# Patient Record
Sex: FEMALE | Race: ASIAN | URBAN - METROPOLITAN AREA
[De-identification: names, ages, dates, MRNs, and addresses within clinical notes are randomized per-mention and may not be internally consistent; named-entity substitution may affect disease eponyms.]

---

## 2019-09-16 ENCOUNTER — THERAPY VISIT (OUTPATIENT)
Dept: PHYSICAL THERAPY | Facility: CLINIC | Age: 49
End: 2019-09-16
Payer: COMMERCIAL

## 2019-09-16 DIAGNOSIS — Z98.1 S/P CERVICAL SPINAL FUSION: ICD-10-CM

## 2019-09-16 PROCEDURE — 97110 THERAPEUTIC EXERCISES: CPT | Mod: GP | Performed by: PHYSICAL THERAPIST

## 2019-09-16 NOTE — LETTER
RADHA DE LEON [facility] [place]  [address]  [place] [address]  [phone]    2019    Re: Eileen Spears   :   1970  MRN:  [id number]   REFERRING PHYSICIAN:   Roderick DE LEON [facility] [place]    Date of Initial Evaluation:  19  Visits:  Rxs Used: 1  Reason for Referral:  S/P cervical spinal fusion    [facility] Initial Evaluation    Subjective:  Eileen Spears being seen for s/p C5-7 fusion.   Problem began 2019. Where condition occurred: at home.Problem occurred: spine surgery  and reported as 0/10 on pain scale. General health as reported by patient is good. Pertinent medical history includes:  High blood pressure and sleep disorder/apnea.  Medical allergies: none.  Surgeries include:  None.  Current medications:  High blood pressure medication and sleep medication.   Primary job tasks include:  Prolonged standing.  Pain quality: none. Pain frequency: none. Pain timing: none. Since onset symptoms are rapidly improving (improved after surgery).   There was none improvement following previous treatment.   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  Barriers include:  None as reported by patient.  Red flags:  None as reported by patient.    Objective:    CERVICAL:    Posture: good posture awareness and able to self correct without cues    Neurological:    Motor Deficit:  Myotomes L R   C4 (shoulder elevation) 5/5 5/5   C5 (shoulder abduction) 5-/5 5/5   C6 (elbow flexion) 5/5 5/5   C7 (elbow extension) 5/5 5/5   C8 (thumb extension)     T1 (finger add/abd)      Strength (lb)       Sensory Deficit, Reflexes, Dural Signs: normal light touch sensation    AROM: (Major, Moderate, Minimal or Nil loss)  Movement Loss Adalid Mod Min Nil Pain   Protrusion        Flexion     3fingers   Retraction        Extension   x  32degs   Left Rotation  x   40degs   Right Rotation  x   47degs   Left Side Bending  x    15degs   Right Side bending  x   22degs     Repeated movement testing: not testing    Static Tests: soft tissue tightness in B upper traps and levator scapulaes, normal C2-7 joint mobility    Assessment/Plan:    Patient is a 49 year old female with cervical complaints.    Patient has the following significant findings with corresponding treatment plan.                Diagnosis 1:  S/p C5-7 fusion  Decreased ROM/flexibility - manual therapy, therapeutic exercise, therapeutic activity and home program  Impaired posture - neuro re-education and therapeutic activities  Previous and current functional limitations:  (See Goal Flow Sheet for this information)    Short term and Long term goals: (See Goal Flow Sheet for this information)   Communication ability:  Patient appears to be able to clearly communicate and understand verbal and written communication and follow directions correctly.  Treatment Explanation - The following has been discussed with the patient:   RX ordered/plan of care  Anticipated outcomes  Possible risks and side effects  This patient would benefit from PT intervention to resume normal activities.   Rehab potential is good.    Frequency:  1 X week, once daily  Duration:  for 12 weeks  Discharge Plan:  Achieve all LTG.  Independent in home treatment program.  Reach maximal therapeutic benefit.    Thank you for your referral.    INQUIRIES  Therapist: OSIEL Rush [facility]  [address] [place] [address]  Phone: [phone]  Fax: [phone]

## 2019-09-16 NOTE — PROGRESS NOTES
[facility] Initial Evaluation  Subjective:    Eileen Spears being seen for s/p C5-7 fusion.   Problem began 5/16/2019. Where condition occurred: at home.Problem occurred: spine surgery  and reported as 0/10 on pain scale. General health as reported by patient is good. Pertinent medical history includes:  High blood pressure and sleep disorder/apnea.  Medical allergies: none.  Surgeries include:  None.  Current medications:  High blood pressure medication and sleep medication.   Primary job tasks include:  Prolonged standing.  Pain quality: none. Pain frequency: none. Pain timing: none. Since onset symptoms are rapidly improving (improved after surgery).   There was none improvement following previous treatment.   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    Barriers include:  None as reported by patient.  Red flags:  None as reported by patient.                      Objective:        CERVICAL:    Posture: good posture awareness and able to self correct without cues    Neurological:    Motor Deficit:  Myotomes L R   C4 (shoulder elevation) 5/5 5/5   C5 (shoulder abduction) 5-/5 5/5   C6 (elbow flexion) 5/5 5/5   C7 (elbow extension) 5/5 5/5   C8 (thumb extension)     T1 (finger add/abd)      Strength (lb)       Sensory Deficit, Reflexes, Dural Signs: normal light touch sensation    AROM: (Major, Moderate, Minimal or Nil loss)  Movement Loss Adalid Mod Min Nil Pain   Protrusion        Flexion     3fingers   Retraction        Extension   x  32degs   Left Rotation  x   40degs   Right Rotation  x   47degs   Left Side Bending  x   15degs   Right Side bending  x   22degs     Repeated movement testing: not testing      Static Tests: soft tissue tightness in B upper traps and levator scapulaes, normal C2-7 joint mobility            System    Physical Exam    General     ROS    Assessment/Plan:    Patient is a 49 year old female with cervical  complaints.    Patient has the following significant findings with corresponding treatment plan.                Diagnosis 1:  S/p C5-7 fusion  Decreased ROM/flexibility - manual therapy, therapeutic exercise, therapeutic activity and home program  Impaired posture - neuro re-education and therapeutic activities    Previous and current functional limitations:  (See Goal Flow Sheet for this information)    Short term and Long term goals: (See Goal Flow Sheet for this information)     Communication ability:  Patient appears to be able to clearly communicate and understand verbal and written communication and follow directions correctly.  Treatment Explanation - The following has been discussed with the patient:   RX ordered/plan of care  Anticipated outcomes  Possible risks and side effects  This patient would benefit from PT intervention to resume normal activities.   Rehab potential is good.    Frequency:  1 X week, once daily  Duration:  for 12 weeks  Discharge Plan:  Achieve all LTG.  Independent in home treatment program.  Reach maximal therapeutic benefit.    Please refer to the daily flowsheet for treatment today, total treatment time and time spent performing 1:1 timed codes.

## 2019-09-23 ENCOUNTER — THERAPY VISIT (OUTPATIENT)
Dept: PHYSICAL THERAPY | Facility: CLINIC | Age: 49
End: 2019-09-23
Payer: COMMERCIAL

## 2019-09-23 DIAGNOSIS — Z98.1 S/P CERVICAL SPINAL FUSION: ICD-10-CM

## 2019-09-23 PROCEDURE — 97112 NEUROMUSCULAR REEDUCATION: CPT | Mod: GP | Performed by: PHYSICAL THERAPIST

## 2019-09-23 PROCEDURE — 97110 THERAPEUTIC EXERCISES: CPT | Mod: GP | Performed by: PHYSICAL THERAPIST

## 2019-09-30 ENCOUNTER — THERAPY VISIT (OUTPATIENT)
Dept: PHYSICAL THERAPY | Facility: CLINIC | Age: 49
End: 2019-09-30
Payer: COMMERCIAL

## 2019-09-30 DIAGNOSIS — Z98.1 S/P CERVICAL SPINAL FUSION: ICD-10-CM

## 2019-09-30 PROCEDURE — 97110 THERAPEUTIC EXERCISES: CPT | Mod: GP | Performed by: PHYSICAL THERAPIST

## 2019-09-30 PROCEDURE — 97112 NEUROMUSCULAR REEDUCATION: CPT | Mod: GP | Performed by: PHYSICAL THERAPIST

## 2019-10-14 ENCOUNTER — THERAPY VISIT (OUTPATIENT)
Dept: PHYSICAL THERAPY | Facility: CLINIC | Age: 49
End: 2019-10-14
Payer: COMMERCIAL

## 2019-10-14 DIAGNOSIS — Z98.1 S/P CERVICAL SPINAL FUSION: ICD-10-CM

## 2019-10-14 PROCEDURE — 97112 NEUROMUSCULAR REEDUCATION: CPT | Mod: GP | Performed by: PHYSICAL THERAPIST

## 2019-10-14 PROCEDURE — 97110 THERAPEUTIC EXERCISES: CPT | Mod: GP | Performed by: PHYSICAL THERAPIST

## 2019-10-14 NOTE — PROGRESS NOTES
Subjective:  HPI                    Objective:  System    Physical Exam    General     ROS    Assessment/Plan:    DISCHARGE REPORT    Progress reporting period is from 9/16/19 to 10/14/19.     SUBJECTIVE  Subjective: pt reports feeling good, no pain, just occasional stiffness that resolves with stretching. admits the exercises help and on avg does them every other day.   Current Pain level: 0/10   Initial Pain level: 0/10   Changes in function: Yes, see goal flow sheet for change in function   Adverse reactions: None;   ,     The objective findings are from DOS 10/14/19.    OBJECTIVE  Objective: AROM cervical SB L:31degs R:33degs  rotation L:54degs R:64degs        ASSESSMENT/PLAN  Updated problem list and treatment plan: Diagnosis 1:  S/p cervical fusion  STG/LTGs have been met or progress has been made towards goals:  Yes, painfree driving  Assessment of Progress: The patient has met all of their long term goals.  Self Management Plans:  Patient is independent in a home treatment program.  [facility] continues to require the following intervention to meet STG and LTG's: PT intervention is no longer required to meet STG/LTG.  We will discharge this patient from PT.    Recommendations:  This patient is ready to be discharged from therapy and continue their home treatment program.    Please refer to the daily flowsheet for treatment today, total treatment time and time spent performing 1:1 timed codes.

## 2019-10-14 NOTE — LETTER
RADHA DE LEON [facility] [place]  [address]  [place] [id number] [phone]    October 15, 2019    Re: Eileen Spears   :   1970  MRN:  [id number]   REFERRING PHYSICIAN:   Roderick CEDENO    Date of Initial Evaluation:  19  Visits:  Rxs Used: 4  Reason for Referral:  S/P cervical spinal fusion    DISCHARGE REPORT  Progress reporting period is from 19 to 10/14/19.     SUBJECTIVE  Subjective: pt reports feeling good, no pain, just occasional stiffness that resolves with stretching. admits the exercises help and on avg does them every other day.   Current Pain level: 0/10   Initial Pain level: 0/10   Changes in function: Yes, see goal flow sheet for change in function   Adverse reactions: None  The objective findings are from DOS 10/14/19.    OBJECTIVE  Objective: AROM cervical SB L:31degs R:33degs  rotation L:54degs R:64degs        ASSESSMENT/PLAN  Updated problem list and treatment plan: Diagnosis 1:  S/p cervical fusion  STG/LTGs have been met or progress has been made towards goals:  Yes, painfree driving  Assessment of Progress: The patient has met all of their long term goals.  Self Management Plans:  Patient is independent in a home treatment program.  Eileen Saravia continues to require the following intervention to meet STG and LTG's: PT intervention is no longer required to meet STG/LTG.  We will discharge this patient from PT.    Recommendations:  This patient is ready to be discharged from therapy and continue their home treatment program.    Thank you for your referral.    INQUIRIES  Therapist: OSIEL Rush [facility]  [address] [place] [address]  Phone: [phone]  Fax: [phone]

## 2019-11-25 PROBLEM — Z98.1 S/P CERVICAL SPINAL FUSION: Status: RESOLVED | Noted: 2019-09-16 | Resolved: 2019-11-25

## 2021-12-28 PROCEDURE — 88305 TISSUE EXAM BY PATHOLOGIST: CPT | Performed by: PATHOLOGY

## 2021-12-29 ENCOUNTER — LAB REQUISITION (OUTPATIENT)
Dept: LAB | Facility: CLINIC | Age: 51
End: 2021-12-29
Payer: COMMERCIAL

## 2021-12-30 LAB
PATH REPORT.COMMENTS IMP SPEC: NORMAL
PATH REPORT.COMMENTS IMP SPEC: NORMAL
PATH REPORT.FINAL DX SPEC: NORMAL
PATH REPORT.GROSS SPEC: NORMAL
PATH REPORT.MICROSCOPIC SPEC OTHER STN: NORMAL
PATH REPORT.RELEVANT HX SPEC: NORMAL
PHOTO IMAGE: NORMAL

## 2023-06-30 ENCOUNTER — LAB REQUISITION (OUTPATIENT)
Dept: LAB | Facility: CLINIC | Age: 53
End: 2023-06-30

## 2023-06-30 DIAGNOSIS — Z13.220 ENCOUNTER FOR SCREENING FOR LIPOID DISORDERS: ICD-10-CM

## 2023-06-30 DIAGNOSIS — Z13.29 ENCOUNTER FOR SCREENING FOR OTHER SUSPECTED ENDOCRINE DISORDER: ICD-10-CM

## 2023-06-30 DIAGNOSIS — E89.41 SYMPTOMATIC POSTPROCEDURAL OVARIAN FAILURE: ICD-10-CM

## 2023-06-30 DIAGNOSIS — Z13.1 ENCOUNTER FOR SCREENING FOR DIABETES MELLITUS: ICD-10-CM

## 2023-06-30 LAB
CHOLEST SERPL-MCNC: 283 MG/DL
HBA1C MFR BLD: 6 %
HDLC SERPL-MCNC: 90 MG/DL
LDLC SERPL CALC-MCNC: 174 MG/DL
NONHDLC SERPL-MCNC: 193 MG/DL
TRIGL SERPL-MCNC: 96 MG/DL

## 2023-06-30 PROCEDURE — 83001 ASSAY OF GONADOTROPIN (FSH): CPT | Performed by: OBSTETRICS & GYNECOLOGY

## 2023-06-30 PROCEDURE — 80061 LIPID PANEL: CPT | Performed by: OBSTETRICS & GYNECOLOGY

## 2023-06-30 PROCEDURE — 84443 ASSAY THYROID STIM HORMONE: CPT | Performed by: OBSTETRICS & GYNECOLOGY

## 2023-06-30 PROCEDURE — 83036 HEMOGLOBIN GLYCOSYLATED A1C: CPT | Performed by: OBSTETRICS & GYNECOLOGY

## 2023-06-30 PROCEDURE — 84439 ASSAY OF FREE THYROXINE: CPT | Performed by: OBSTETRICS & GYNECOLOGY

## 2023-06-30 PROCEDURE — 82670 ASSAY OF TOTAL ESTRADIOL: CPT | Performed by: OBSTETRICS & GYNECOLOGY

## 2023-07-01 LAB
ESTRADIOL SERPL-MCNC: 6 PG/ML
FSH SERPL IRP2-ACNC: 55.7 MIU/ML
T4 FREE SERPL-MCNC: 1.2 NG/DL (ref 0.9–1.7)
TSH SERPL DL<=0.005 MIU/L-ACNC: 5.28 UIU/ML (ref 0.3–4.2)